# Patient Record
Sex: MALE | Race: BLACK OR AFRICAN AMERICAN | NOT HISPANIC OR LATINO
[De-identification: names, ages, dates, MRNs, and addresses within clinical notes are randomized per-mention and may not be internally consistent; named-entity substitution may affect disease eponyms.]

---

## 2020-04-26 ENCOUNTER — MESSAGE (OUTPATIENT)
Age: 30
End: 2020-04-26

## 2020-05-07 LAB
SARS-COV-2 IGG SERPL IA-ACNC: <0.1 INDEX
SARS-COV-2 IGG SERPL QL IA: NEGATIVE

## 2021-11-03 ENCOUNTER — EMERGENCY (EMERGENCY)
Facility: HOSPITAL | Age: 31
LOS: 1 days | Discharge: HOME | End: 2021-11-03
Attending: EMERGENCY MEDICINE
Payer: COMMERCIAL

## 2021-11-03 VITALS
TEMPERATURE: 98 F | HEIGHT: 72 IN | RESPIRATION RATE: 18 BRPM | SYSTOLIC BLOOD PRESSURE: 154 MMHG | OXYGEN SATURATION: 99 % | WEIGHT: 235.01 LBS | HEART RATE: 99 BPM | DIASTOLIC BLOOD PRESSURE: 99 MMHG

## 2021-11-03 VITALS — DIASTOLIC BLOOD PRESSURE: 101 MMHG | SYSTOLIC BLOOD PRESSURE: 148 MMHG

## 2021-11-03 PROCEDURE — 99282 EMERGENCY DEPT VISIT SF MDM: CPT

## 2021-11-03 PROCEDURE — 99284 EMERGENCY DEPT VISIT MOD MDM: CPT

## 2021-11-03 PROCEDURE — 82962 GLUCOSE BLOOD TEST: CPT

## 2021-11-03 NOTE — ED ADULT TRIAGE NOTE - CHIEF COMPLAINT QUOTE
Sudden onset of Dizziness on standing 1800, pt states "I though I was going to fall" no facial asymmetry, no arm drift

## 2021-11-03 NOTE — ED PROVIDER NOTE - OBJECTIVE STATEMENT
Dr. Mueller Note: 31M no PMHx, preceding "fuzziness" with decreased hearing sensation of b/l ears for few days with acute dizziness when standing up in cafeteria, improved now, worse with position.  No diplopia, dysphagia, dysarthria, ataxia, focal weakness in arm or leg. No Fhx of stroke/aneursym.

## 2021-11-03 NOTE — ED PROVIDER NOTE - PROGRESS NOTE DETAILS
Patient dizziness. Improved. Patient states he has had some congestion for the past few days. States his ears feel full. Neuro exam completely intact. Ears without signs of infection or otorrhea. Patient has ENT follow-up scheduled with ENT in 2 days. Encouraged patient to follow-up with ENT. Cristina Rodriguez MD

## 2021-11-03 NOTE — ED ADULT NURSE NOTE - OBJECTIVE STATEMENT
31 year old male patient presents ambulatory to ED c/o episode of dizziness after eating lasting for 15 minutes. Patient states he stood up after eating and felt sudden onset of dizziness and states he felt like he was going to pass out. Code stroke called upon triage at 1828, and cancelled by MD Mueller at 1830. Patient reports having pressure to b/l ears and reports decreased hearing today. Denies current HA, dizziness, CP, palpitations, SOB, abd pain, n/v/d, fever, chills, numbness, tingling, weakness. Patient aware of plan of care for evaluation. Patient has appointment with ENT this week.

## 2021-11-03 NOTE — ED PROVIDER NOTE - NSFOLLOWUPCLINICS_GEN_ALL_ED_FT
[facility] - ENT  Otolaryngology (ENT)  [address] [place] [address]  Phone: [phone]  Fax:

## 2021-11-03 NOTE — ED PROVIDER NOTE - PATIENT PORTAL LINK FT
You can access the FollowMyHealth Patient Portal offered by [facility] by registering at the following website: [email or web address]. By joining [facility]’s FollowMyHealth portal, you will also be able to view your health information using other applications (apps) compatible with our system.

## 2021-11-03 NOTE — ED PROVIDER NOTE - CLINICAL SUMMARY MEDICAL DECISION MAKING FREE TEXT BOX
Dr. Mueller Note: acute lightheadedness, possibly vertigo spell without focal neuro deficit and resolving symptoms, full exam, accucheck, serial exam

## 2021-11-03 NOTE — ED PROVIDER NOTE - NSFOLLOWUPINSTRUCTIONS_ED_ALL_ED_FT
You were evaluated in the Emergency Department for dizziness.  You were evaluated and examined by a physician, and your blood sugar was normal and your physical exam did not show a cause for your symptoms.     There are no signs of emergency conditions requiring admission to the hospital on today's workup.     We recommend that you:  1. See your ENT within the next 72 hours for follow up.  Bring a copy of your discharge paperwork (including any test results) to your doctor.  2. Please only take cold medication as needed.       Return immediately if you have worsening symptoms, passing out, chest pain, vomiting, confusion, sleepiness, or any other new/concerning symptoms. You were evaluated in the Emergency Department for dizziness.  You were evaluated and examined by a physician, and your blood sugar was normal and your physical exam did not show a cause for your symptoms.     There are no signs of emergency conditions requiring admission to the hospital on today's workup.     We recommend that you:  1. See your ENT within the next 72 hours for follow up.  Bring a copy of your discharge paperwork (including any test results) to your doctor.  2. Please only take cold medication as needed.   3. Please take Zyrtec-D that can be purchased over the counter as instructed on the box.       Return immediately if you have worsening symptoms, passing out, chest pain, vomiting, confusion, sleepiness, or any other new/concerning symptoms.

## 2021-11-03 NOTE — CHART NOTE - NSCHARTNOTEFT_GEN_A_CORE
Patient 32yo M employee presents for acute onset dizziness when standing and nonfocal neuro exam. Code stroke ended in agreement with ED attending and since this does not meet BEFAST criteria.     Please call neuro as needed.

## 2024-09-16 ENCOUNTER — INPATIENT (INPATIENT)
Facility: HOSPITAL | Age: 34
LOS: 3 days | Discharge: HOME | DRG: 440 | End: 2024-09-20
Attending: INTERNAL MEDICINE | Admitting: INTERNAL MEDICINE
Payer: COMMERCIAL

## 2024-09-16 VITALS
TEMPERATURE: 98 F | OXYGEN SATURATION: 98 % | RESPIRATION RATE: 18 BRPM | DIASTOLIC BLOOD PRESSURE: 91 MMHG | WEIGHT: 220.02 LBS | SYSTOLIC BLOOD PRESSURE: 130 MMHG

## 2024-09-16 LAB
ADD ON TEST-SPECIMEN IN LAB: SIGNIFICANT CHANGE UP
ALBUMIN SERPL ELPH-MCNC: 4 G/DL — SIGNIFICANT CHANGE UP (ref 3.3–5)
ALP SERPL-CCNC: 126 U/L — HIGH (ref 40–120)
ALT FLD-CCNC: 70 U/L — HIGH (ref 10–45)
ANION GAP SERPL CALC-SCNC: 17 MMOL/L — SIGNIFICANT CHANGE UP (ref 5–17)
APTT BLD: 35.2 SEC — SIGNIFICANT CHANGE UP (ref 24.5–35.6)
AST SERPL-CCNC: 84 U/L — HIGH (ref 10–40)
BASOPHILS # BLD AUTO: 0 K/UL — SIGNIFICANT CHANGE UP (ref 0–0.2)
BASOPHILS NFR BLD AUTO: 0 % — SIGNIFICANT CHANGE UP (ref 0–2)
BILIRUB SERPL-MCNC: 1.8 MG/DL — HIGH (ref 0.2–1.2)
BLD GP AB SCN SERPL QL: NEGATIVE — SIGNIFICANT CHANGE UP
BUN SERPL-MCNC: 8 MG/DL — SIGNIFICANT CHANGE UP (ref 7–23)
CALCIUM SERPL-MCNC: 9.2 MG/DL — SIGNIFICANT CHANGE UP (ref 8.4–10.5)
CHLORIDE SERPL-SCNC: 99 MMOL/L — SIGNIFICANT CHANGE UP (ref 96–108)
CHOLEST SERPL-MCNC: 457 MG/DL — HIGH
CO2 SERPL-SCNC: 20 MMOL/L — LOW (ref 22–31)
CREAT SERPL-MCNC: 1.1 MG/DL — SIGNIFICANT CHANGE UP (ref 0.5–1.3)
DACRYOCYTES BLD QL SMEAR: SLIGHT — SIGNIFICANT CHANGE UP
EGFR: 91 ML/MIN/1.73M2 — SIGNIFICANT CHANGE UP
EOSINOPHIL # BLD AUTO: 0 K/UL — SIGNIFICANT CHANGE UP (ref 0–0.5)
EOSINOPHIL NFR BLD AUTO: 0 % — SIGNIFICANT CHANGE UP (ref 0–6)
FLUAV AG NPH QL: SIGNIFICANT CHANGE UP
FLUBV AG NPH QL: SIGNIFICANT CHANGE UP
GLUCOSE SERPL-MCNC: 128 MG/DL — HIGH (ref 70–99)
HCT VFR BLD CALC: 41.9 % — SIGNIFICANT CHANGE UP (ref 39–50)
HDLC SERPL-MCNC: 21 MG/DL — LOW
HGB BLD-MCNC: 15.1 G/DL — SIGNIFICANT CHANGE UP (ref 13–17)
INR BLD: 1.12 RATIO — SIGNIFICANT CHANGE UP (ref 0.85–1.18)
LIDOCAIN IGE QN: 500 U/L — HIGH (ref 7–60)
LIPID PNL WITH DIRECT LDL SERPL: SIGNIFICANT CHANGE UP MG/DL
LYMPHOCYTES # BLD AUTO: 0.89 K/UL — LOW (ref 1–3.3)
LYMPHOCYTES # BLD AUTO: 5.2 % — LOW (ref 13–44)
MANUAL SMEAR VERIFICATION: SIGNIFICANT CHANGE UP
MCHC RBC-ENTMCNC: 32.5 PG — SIGNIFICANT CHANGE UP (ref 27–34)
MCHC RBC-ENTMCNC: 36 GM/DL — SIGNIFICANT CHANGE UP (ref 32–36)
MCV RBC AUTO: 90.1 FL — SIGNIFICANT CHANGE UP (ref 80–100)
MONOCYTES # BLD AUTO: 1.33 K/UL — HIGH (ref 0–0.9)
MONOCYTES NFR BLD AUTO: 7.7 % — SIGNIFICANT CHANGE UP (ref 2–14)
NEUTROPHILS # BLD AUTO: 14.99 K/UL — HIGH (ref 1.8–7.4)
NEUTROPHILS NFR BLD AUTO: 87.1 % — HIGH (ref 43–77)
NON HDL CHOLESTEROL: 436 MG/DL — HIGH
PLAT MORPH BLD: NORMAL — SIGNIFICANT CHANGE UP
PLATELET # BLD AUTO: 183 K/UL — SIGNIFICANT CHANGE UP (ref 150–400)
POIKILOCYTOSIS BLD QL AUTO: SLIGHT — SIGNIFICANT CHANGE UP
POTASSIUM SERPL-MCNC: 3.3 MMOL/L — LOW (ref 3.5–5.3)
POTASSIUM SERPL-SCNC: 3.3 MMOL/L — LOW (ref 3.5–5.3)
PROT SERPL-MCNC: 7.4 G/DL — SIGNIFICANT CHANGE UP (ref 6–8.3)
PROTHROM AB SERPL-ACNC: 11.7 SEC — SIGNIFICANT CHANGE UP (ref 9.5–13)
RBC # BLD: 4.65 M/UL — SIGNIFICANT CHANGE UP (ref 4.2–5.8)
RBC # FLD: 12.8 % — SIGNIFICANT CHANGE UP (ref 10.3–14.5)
RBC BLD AUTO: ABNORMAL
RH IG SCN BLD-IMP: POSITIVE — SIGNIFICANT CHANGE UP
RSV RNA NPH QL NAA+NON-PROBE: SIGNIFICANT CHANGE UP
SARS-COV-2 RNA SPEC QL NAA+PROBE: SIGNIFICANT CHANGE UP
SODIUM SERPL-SCNC: 136 MMOL/L — SIGNIFICANT CHANGE UP (ref 135–145)
TRIGL SERPL-MCNC: 1457 MG/DL — HIGH
WBC # BLD: 17.21 K/UL — HIGH (ref 3.8–10.5)
WBC # FLD AUTO: 17.21 K/UL — HIGH (ref 3.8–10.5)

## 2024-09-16 PROCEDURE — 99053 MED SERV 10PM-8AM 24 HR FAC: CPT

## 2024-09-16 PROCEDURE — 74177 CT ABD & PELVIS W/CONTRAST: CPT | Mod: 26,MC

## 2024-09-16 PROCEDURE — 99285 EMERGENCY DEPT VISIT HI MDM: CPT

## 2024-09-16 RX ORDER — OXYCODONE AND ACETAMINOPHEN 7.5; 325 MG/1; MG/1
1 TABLET ORAL ONCE
Refills: 0 | Status: DISCONTINUED | OUTPATIENT
Start: 2024-09-16 | End: 2024-09-16

## 2024-09-16 RX ORDER — SODIUM CHLORIDE 9 MG/ML
1000 INJECTION INTRAMUSCULAR; INTRAVENOUS; SUBCUTANEOUS
Refills: 0 | Status: DISCONTINUED | OUTPATIENT
Start: 2024-09-16 | End: 2024-09-20

## 2024-09-16 RX ORDER — ACETAMINOPHEN 325 MG/1
1000 TABLET ORAL ONCE
Refills: 0 | Status: COMPLETED | OUTPATIENT
Start: 2024-09-16 | End: 2024-09-16

## 2024-09-16 RX ORDER — SODIUM CHLORIDE 9 MG/ML
1000 INJECTION INTRAMUSCULAR; INTRAVENOUS; SUBCUTANEOUS ONCE
Refills: 0 | Status: COMPLETED | OUTPATIENT
Start: 2024-09-16 | End: 2024-09-16

## 2024-09-16 RX ORDER — FLU VACCINE TS 2012-2013(5YR+) 45MCG/.5ML
0.5 VIAL (ML) INTRAMUSCULAR ONCE
Refills: 0 | Status: DISCONTINUED | OUTPATIENT
Start: 2024-09-16 | End: 2024-09-20

## 2024-09-16 RX ORDER — OXYCODONE HYDROCHLORIDE 5 MG/1
5 TABLET ORAL EVERY 6 HOURS
Refills: 0 | Status: DISCONTINUED | OUTPATIENT
Start: 2024-09-16 | End: 2024-09-17

## 2024-09-16 RX ORDER — POTASSIUM CHLORIDE 10 MEQ
40 TABLET, EXT RELEASE, PARTICLES/CRYSTALS ORAL ONCE
Refills: 0 | Status: COMPLETED | OUTPATIENT
Start: 2024-09-16 | End: 2024-09-16

## 2024-09-16 RX ADMIN — OXYCODONE HYDROCHLORIDE 5 MILLIGRAM(S): 5 TABLET ORAL at 22:31

## 2024-09-16 RX ADMIN — SODIUM CHLORIDE 1000 MILLILITER(S): 9 INJECTION INTRAMUSCULAR; INTRAVENOUS; SUBCUTANEOUS at 09:37

## 2024-09-16 RX ADMIN — OXYCODONE HYDROCHLORIDE 5 MILLIGRAM(S): 5 TABLET ORAL at 21:41

## 2024-09-16 RX ADMIN — Medication 1 MILLIGRAM(S): at 23:01

## 2024-09-16 RX ADMIN — Medication 1 MILLIGRAM(S): at 15:54

## 2024-09-16 RX ADMIN — ACETAMINOPHEN 400 MILLIGRAM(S): 325 TABLET ORAL at 07:59

## 2024-09-16 RX ADMIN — OXYCODONE HYDROCHLORIDE 5 MILLIGRAM(S): 5 TABLET ORAL at 12:05

## 2024-09-16 RX ADMIN — Medication 40 MILLIEQUIVALENT(S): at 18:19

## 2024-09-16 RX ADMIN — SODIUM CHLORIDE 1000 MILLILITER(S): 9 INJECTION INTRAMUSCULAR; INTRAVENOUS; SUBCUTANEOUS at 07:59

## 2024-09-16 NOTE — CONSULT NOTE ADULT - SUBJECTIVE AND OBJECTIVE BOX
[facility]    Branden Philippe Núñez NP    [address] [place] [address] [phone]      Chief Complaint:  Patient is a 34y old  Male who presents with a chief complaint of     HPI:33 yo M with no reported PMH p/w RLQ pain x Sunday morning. Pain has been constant since onset, was radiating to his back but now localized to his abdomen. Worse with certain movements. Has never had before. Denies nausea, vomiting, fever, chills, chest pain, diarrhea, blood in stool, diarrhea, penile pain/swelling, testicular pain/swelling. No recent travel.  admits to recent alcohol use     Allergies:  No Known Allergies      Medications:  morphine  - Injectable 1 milliGRAM(s) IV Push every 6 hours PRN  oxyCODONE    IR 5 milliGRAM(s) Oral every 6 hours PRN  sodium chloride 0.9%. 1000 milliLiter(s) IV Continuous <Continuous>      PMHX/PSHX:  No pertinent past medical history    No significant past surgical history        Family history:  No pertinent family history in first degree relatives        Social History:  + etoh use    ROS:     General:  No wt loss, fevers, chills, night sweats, fatigue,   Eyes:  Good vision, no reported pain  ENT:  No sore throat, pain, runny nose, dysphagia  CV:  No pain, palpitations, hypo/hypertension  Resp:  No dyspnea, cough, tachypnea, wheezing  GI:  + pain, No nausea, No vomiting, No diarrhea, No constipation, No weight loss, No fever, No pruritis, No rectal bleeding, No tarry stools, No dysphagia,  :  No pain, bleeding, incontinence, nocturia  Muscle:  No pain, weakness  Neuro:  No weakness, tingling, memory problems  Psych:  No fatigue, insomnia, mood problems, depression  Endocrine:  No polyuria, polydipsia, cold/heat intolerance  Heme:  No petechiae, ecchymosis, easy bruisability  Skin:  No rash, tattoos, scars, edema      PHYSICAL EXAM:   Vital Signs:  Vital Signs Last 24 Hrs  T(C): 36.7 (16 Sep 2024 13:04), Max: 36.7 (16 Sep 2024 07:21)  T(F): 98.1 (16 Sep 2024 13:04), Max: 98.1 (16 Sep 2024 07:21)  HR: 67 (16 Sep 2024 13:04) (67 - 94)  BP: 132/87 (16 Sep 2024 13:04) (130/91 - 135/85)  BP(mean): --  RR: 18 (16 Sep 2024 13:04) (17 - 18)  SpO2: 99% (16 Sep 2024 13:04) (98% - 99%)    Parameters below as of 16 Sep 2024 13:04  Patient On (Oxygen Delivery Method): room air      Daily     Daily     GENERAL:  Appears stated age, well-groomed, well-nourished, no distress  HEENT:  NC/AT,  conjunctivae clear and pink, no thyromegaly, nodules, adenopathy, no JVD, sclera -anicteric  CHEST:  Full & symmetric excursion, no increased effort, breath sounds clear  HEART:  Regular rhythm, S1, S2, no murmur/rub/S3/S4, no abdominal bruit, no edema  ABDOMEN:  Soft, non-tender, non-distended, normoactive bowel sounds,  no masses ,no hepato-splenomegaly, no signs of chronic liver disease  EXTEREMITIES:  no cyanosis,clubbing or edema  SKIN:  No rash/erythema/ecchymoses/petechiae/wounds/abscess/warm/dry  NEURO:  Alert, oriented, no asterixis, no tremor, no encephalopathy    LABS:                        15.1   17.21 )-----------( 183      ( 16 Sep 2024 08:51 )             41.9     09-16    136  |  99  |  8   ----------------------------<  128<H>  3.3<L>   |  20<L>  |  1.10    Ca    9.2      16 Sep 2024 08:51    TPro  7.4  /  Alb  4.0  /  TBili  1.8<H>  /  DBili  x   /  AST  84<H>  /  ALT  70<H>  /  AlkPhos  126<H>  09-16    LIVER FUNCTIONS - ( 16 Sep 2024 08:51 )  Alb: 4.0 g/dL / Pro: 7.4 g/dL / ALK PHOS: 126 U/L / ALT: 70 U/L / AST: 84 U/L / GGT: x           PT/INR - ( 16 Sep 2024 08:51 )   PT: 11.7 sec;   INR: 1.12 ratio         PTT - ( 16 Sep 2024 08:51 )  PTT:35.2 sec  Urinalysis Basic - ( 16 Sep 2024 08:51 )    Color: x / Appearance: x / SG: x / pH: x  Gluc: 128 mg/dL / Ketone: x  / Bili: x / Urobili: x   Blood: x / Protein: x / Nitrite: x   Leuk Esterase: x / RBC: x / WBC x   Sq Epi: x / Non Sq Epi: x / Bacteria: x      Amylase Serum--      Lipase [id number]       Ammonia--      Imaging:

## 2024-09-16 NOTE — CONSULT NOTE ADULT - ASSESSMENT
pancreatitis  abnormal ct  elevated lfts    suspect etoh induced pancreatitis  npo  iv fluid  pain control  montior abdominal exam  etoh cessation  d/w patient

## 2024-09-16 NOTE — ED PROVIDER NOTE - ATTENDING APP SHARED VISIT CONTRIBUTION OF CARE
PMD none  34-year-old male no past medical history, presents with 1 day history of abdominal pain right lower quadrant, moderately severe worse with palpation, movement associated with slight chills back pain, without N/V, cough, chest pain, urinary symptoms.  Patient denies habits, travel, DM, HTN, HLD, CVA, CAD, abdominal surgery, previous similar symptoms.  Social patient is single, employed [employer]  HEENT normocephalic/atraumatic.  Chest clear A&P.  CV no rubs gallop murmur.  Patient tachycardic.  Abdomen right lower quadrant tenderness, without rebound guarding or masses.  No Cazares's, scars, rashes.  Neuro GCS 15 speech fluent moves all extremities.  Yakov Jaramillo MD, Facep

## 2024-09-16 NOTE — ED PROVIDER NOTE - OBJECTIVE STATEMENT
35 yo M with no reported PMH p/w RLQ pain x Sunday morning. Pain has been constant since onset, was radiating to his back but now localized to his abdomen. Worse with certain movements. Has never had before. Denies nausea, vomiting, fever, chills, chest pain, diarrhea, blood in stool, diarrhea, penile pain/swelling, testicular pain/swelling. No recent travel.

## 2024-09-16 NOTE — H&P ADULT - NSHPPHYSICALEXAM_GEN_ALL_CORE
Vital Signs Last 24 Hrs  T(C): 36.7 (16 Sep 2024 11:12), Max: 36.7 (16 Sep 2024 07:21)  T(F): 98.1 (16 Sep 2024 11:12), Max: 98.1 (16 Sep 2024 07:21)  HR: 94 (16 Sep 2024 11:12) (94 - 94)  BP: 135/85 (16 Sep 2024 11:12) (130/91 - 135/85)  BP(mean): --  RR: 17 (16 Sep 2024 11:12) (17 - 18)  SpO2: 98% (16 Sep 2024 11:12) (98% - 98%)    Parameters below as of 16 Sep 2024 11:12  Patient On (Oxygen Delivery Method): room air      PHYSICAL EXAM:  GENERAL: NAD, well-developed  HEAD:  Atraumatic, Normocephalic  EYES: EOMI, PERRLA, conjunctiva and sclera clear  NECK: Supple, No JVD  CHEST/LUNG: Clear to auscultation bilaterally; No wheeze  HEART: Regular rate and rhythm; No murmurs, rubs, or gallops  ABDOMEN: Soft, Nontender, Nondistended; Bowel sounds present  EXTREMITIES:  2+ Peripheral Pulses, No clubbing, cyanosis, or edema  PSYCH: AAOx3  NEUROLOGY: non-focal  SKIN: No rashes or lesions Vital Signs Last 24 Hrs  T(C): 36.7 (16 Sep 2024 11:12), Max: 36.7 (16 Sep 2024 07:21)  T(F): 98.1 (16 Sep 2024 11:12), Max: 98.1 (16 Sep 2024 07:21)  HR: 94 (16 Sep 2024 11:12) (94 - 94)  BP: 135/85 (16 Sep 2024 11:12) (130/91 - 135/85)  BP(mean): --  RR: 17 (16 Sep 2024 11:12) (17 - 18)  SpO2: 98% (16 Sep 2024 11:12) (98% - 98%)    Parameters below as of 16 Sep 2024 11:12  Patient On (Oxygen Delivery Method): room air      PHYSICAL EXAM:  GENERAL: NAD, well-developed  HEAD:  Atraumatic, Normocephalic  EYES: EOMI, PERRLA, conjunctiva and sclera clear  NECK: Supple, No JVD  CHEST/LUNG: Clear to auscultation bilaterally; No wheeze  HEART: Regular rate and rhythm; No murmurs, rubs, or gallops  ABDOMEN: Soft, mild epigastric tender, Nondistended; Bowel sounds present  EXTREMITIES:  2+ Peripheral Pulses, No clubbing, cyanosis, or edema  PSYCH: AAOx3  NEUROLOGY: non-focal  SKIN: No rashes or lesions

## 2024-09-16 NOTE — H&P ADULT - HISTORY OF PRESENT ILLNESS
33 yo M with no reported PMH p/w RLQ pain x Sunday morning. Pain has been constant since onset, was radiating to his back but now localized to his abdomen. Worse with certain movements. Has never had before. Denies nausea, vomiting, fever, chills, chest pain, diarrhea, blood in stool, diarrhea, penile pain/swelling, testicular pain/swelling. No recent travel. 33 yo M with no reported PMH p/w RLQ pain x Sunday morning. Pain has been constant since onset, was radiating to his back but now localized to his abdomen. Worse with certain movements. Has never had before. Denies nausea, vomiting, fever, chills, chest pain, diarrhea, blood in stool, diarrhea, penile pain/swelling, testicular pain/swelling. No recent travel.  admits to recent alcohol use

## 2024-09-16 NOTE — ED ADULT NURSE NOTE - OBJECTIVE STATEMENT
Patient is a 34y male presenting to the ED with c/o abdominal pain. Patient A&Ox4. Patient is speaking coherently in full sentences. Reports developing right sided lower abdominal pain yesterday morning; states the pain worsened last night and this morning. States he was unable to sleep last night due to the pain. Also reports nausea and "vomited a little" this morning. Denies diarrhea, chest pain, SOB, fever/chills, burning upon urination or difficulty urinating. Respirations spontaneous, even, and non-labored. Abdomen soft, non-distended and RLQ is tender to palpation.

## 2024-09-16 NOTE — ED PROVIDER NOTE - CLINICAL SUMMARY MEDICAL DECISION MAKING FREE TEXT BOX
Otherwise healthy adult male with 1 day history of right lower quadrant abdominal pain and tenderness, concerns for appendicitis, diverticulitis less likely biliary disease.  Plan IV fluids, analgesics, CT abdomen pelvis, reassess.  Yakov Jaramillo MD, Facep

## 2024-09-16 NOTE — H&P ADULT - ASSESSMENT
33 yo male no known pmhx here with acute pancreatitis         35 yo male no known pmhx here with acute pancreatitis, likley 2/2 alcohol use    pancreatitis  gi consulted  npo  ivf  pain control  trend enzymes  check FLP    dvt ppx      Advanced care planning was discussed with patient and family.  Advanced care planning forms were reviewed and discussed as appropriate.  Differential diagnosis and plan of care discussed with patient after the evaluation.   Pain assessed and judicious use of narcotics when appropriate was discussed.  Importance of Fall prevention discussed.  Counseling on Smoking and Alcohol cessation was offered when appropriate.  Counseling on Diet, exercise, and medication compliance was done.

## 2024-09-16 NOTE — ED PROVIDER NOTE - PHYSICAL EXAMINATION
CONSTITUTIONAL: Well appearing and in no apparent distress.  ENT: Airway patent, moist mucous membranes.   EYES: Pupils equal, round and reactive to light. EOMI. Conjunctiva normal appearing.   CARDIAC: Normal rate, regular rhythm.  Heart sounds S1, S2.    RESPIRATORY: Breath sounds clear and equal bilaterally.   GASTROINTESTINAL: Abdomen soft, +RLQ TTP, not distended. No rebound or guarding.   MUSCULOSKELETAL: Spine appears normal.  NEUROLOGICAL: Alert and oriented x3, no focal deficits, no motor or sensory deficits. 5/5 muscle strength throughout.  SKIN: Skin normal color, warm, dry and intact.   PSYCHIATRIC: Normal mood and affect.

## 2024-09-16 NOTE — ED ADULT NURSE NOTE - NSFALLUNIVINTERV_ED_ALL_ED
Bed/Stretcher in lowest position, wheels locked, appropriate side rails in place/Call bell, personal items and telephone in reach/Instruct patient to call for assistance before getting out of bed/chair/stretcher/Non-slip footwear applied when patient is off stretcher/Veteran to call system/Physically safe environment - no spills, clutter or unnecessary equipment/Purposeful proactive rounding/Room/bathroom lighting operational, light cord in reach

## 2024-09-17 LAB
ADD ON TEST-SPECIMEN IN LAB: SIGNIFICANT CHANGE UP
ALBUMIN SERPL ELPH-MCNC: 3.1 G/DL — LOW (ref 3.3–5)
ALP SERPL-CCNC: 92 U/L — SIGNIFICANT CHANGE UP (ref 40–120)
ALT FLD-CCNC: 42 U/L — SIGNIFICANT CHANGE UP (ref 10–45)
ANION GAP SERPL CALC-SCNC: 13 MMOL/L — SIGNIFICANT CHANGE UP (ref 5–17)
AST SERPL-CCNC: 59 U/L — HIGH (ref 10–40)
BILIRUB SERPL-MCNC: 1.3 MG/DL — HIGH (ref 0.2–1.2)
BUN SERPL-MCNC: 5 MG/DL — LOW (ref 7–23)
CALCIUM SERPL-MCNC: 8.5 MG/DL — SIGNIFICANT CHANGE UP (ref 8.4–10.5)
CHLORIDE SERPL-SCNC: 103 MMOL/L — SIGNIFICANT CHANGE UP (ref 96–108)
CO2 SERPL-SCNC: 18 MMOL/L — LOW (ref 22–31)
CREAT SERPL-MCNC: 0.71 MG/DL — SIGNIFICANT CHANGE UP (ref 0.5–1.3)
EGFR: 124 ML/MIN/1.73M2 — SIGNIFICANT CHANGE UP
GLUCOSE SERPL-MCNC: 92 MG/DL — SIGNIFICANT CHANGE UP (ref 70–99)
HCT VFR BLD CALC: 37.4 % — LOW (ref 39–50)
HGB BLD-MCNC: 12.6 G/DL — LOW (ref 13–17)
LIDOCAIN IGE QN: 185 U/L — HIGH (ref 7–60)
MCHC RBC-ENTMCNC: 31.3 PG — SIGNIFICANT CHANGE UP (ref 27–34)
MCHC RBC-ENTMCNC: 33.7 GM/DL — SIGNIFICANT CHANGE UP (ref 32–36)
MCV RBC AUTO: 93 FL — SIGNIFICANT CHANGE UP (ref 80–100)
NRBC # BLD: 0 /100 WBCS — SIGNIFICANT CHANGE UP (ref 0–0)
PLATELET # BLD AUTO: 136 K/UL — LOW (ref 150–400)
POTASSIUM SERPL-MCNC: 3.9 MMOL/L — SIGNIFICANT CHANGE UP (ref 3.5–5.3)
POTASSIUM SERPL-SCNC: 3.9 MMOL/L — SIGNIFICANT CHANGE UP (ref 3.5–5.3)
PROT SERPL-MCNC: 6.5 G/DL — SIGNIFICANT CHANGE UP (ref 6–8.3)
RBC # BLD: 4.02 M/UL — LOW (ref 4.2–5.8)
RBC # FLD: 13.3 % — SIGNIFICANT CHANGE UP (ref 10.3–14.5)
SODIUM SERPL-SCNC: 134 MMOL/L — LOW (ref 135–145)
WBC # BLD: 19.58 K/UL — HIGH (ref 3.8–10.5)
WBC # FLD AUTO: 19.58 K/UL — HIGH (ref 3.8–10.5)

## 2024-09-17 RX ORDER — FENOFIBRATE 145 MG/1
48 TABLET, FILM COATED ORAL DAILY
Refills: 0 | Status: DISCONTINUED | OUTPATIENT
Start: 2024-09-17 | End: 2024-09-20

## 2024-09-17 RX ADMIN — Medication 1 MILLIGRAM(S): at 12:16

## 2024-09-17 RX ADMIN — Medication 2 MILLIGRAM(S): at 21:30

## 2024-09-17 RX ADMIN — OXYCODONE HYDROCHLORIDE 5 MILLIGRAM(S): 5 TABLET ORAL at 08:33

## 2024-09-17 RX ADMIN — Medication 2 MILLIGRAM(S): at 20:55

## 2024-09-17 RX ADMIN — Medication 1 MILLIGRAM(S): at 05:04

## 2024-09-17 RX ADMIN — FENOFIBRATE 48 MILLIGRAM(S): 145 TABLET, FILM COATED ORAL at 12:16

## 2024-09-17 RX ADMIN — OXYCODONE HYDROCHLORIDE 5 MILLIGRAM(S): 5 TABLET ORAL at 07:33

## 2024-09-17 RX ADMIN — Medication 1 MILLIGRAM(S): at 05:51

## 2024-09-17 RX ADMIN — Medication 1 MILLIGRAM(S): at 00:01

## 2024-09-17 RX ADMIN — Medication 1 MILLIGRAM(S): at 13:16

## 2024-09-17 NOTE — PROGRESS NOTE ADULT - ASSESSMENT
33 yo male no known pmhx here with acute pancreatitis, likley 2/2 alcohol use    pancreatitis  gi consulted  npo  ivf  pain control  trend enzymes    hypertriglyceridemia  FLP noted  fenofibrate ordered  lifestyle modifications    alcohol cessation.     dvt ppx      Advanced care planning was discussed with patient and family.  Advanced care planning forms were reviewed and discussed as appropriate.  Differential diagnosis and plan of care discussed with patient after the evaluation.   Pain assessed and judicious use of narcotics when appropriate was discussed.  Importance of Fall prevention discussed.  Counseling on Smoking and Alcohol cessation was offered when appropriate.  Counseling on Diet, exercise, and medication compliance was done.                  33 yo male no known pmhx here with acute pancreatitis, likley 2/2 alcohol use    pancreatitis  gi consulted  npo  ivf  pain control  trend enzymes  alcohol cessation.     hypertriglyceridemia  FLP noted  fenofibrate ordered  lifestyle modifications    leukocytosis  likely reactive  monitor     dvt ppx      Advanced care planning was discussed with patient and family.  Advanced care planning forms were reviewed and discussed as appropriate.  Differential diagnosis and plan of care discussed with patient after the evaluation.   Pain assessed and judicious use of narcotics when appropriate was discussed.  Importance of Fall prevention discussed.  Counseling on Smoking and Alcohol cessation was offered when appropriate.  Counseling on Diet, exercise, and medication compliance was done.

## 2024-09-17 NOTE — PROGRESS NOTE ADULT - ASSESSMENT
pancreatitis  abnormal ct  elevated lfts    suspect etoh induced pancreatitis  clears as tolerated  iv fluid  pain control  montior abdominal exam  etoh cessation  d/w patient

## 2024-09-18 LAB
ALBUMIN SERPL ELPH-MCNC: 3 G/DL — LOW (ref 3.3–5)
ALP SERPL-CCNC: 98 U/L — SIGNIFICANT CHANGE UP (ref 40–120)
ALT FLD-CCNC: 35 U/L — SIGNIFICANT CHANGE UP (ref 10–45)
ANION GAP SERPL CALC-SCNC: 12 MMOL/L — SIGNIFICANT CHANGE UP (ref 5–17)
AST SERPL-CCNC: 45 U/L — HIGH (ref 10–40)
BILIRUB SERPL-MCNC: 1.6 MG/DL — HIGH (ref 0.2–1.2)
BUN SERPL-MCNC: 6 MG/DL — LOW (ref 7–23)
C DIFF GDH STL QL: NEGATIVE — SIGNIFICANT CHANGE UP
C DIFF GDH STL QL: SIGNIFICANT CHANGE UP
CALCIUM SERPL-MCNC: 8.4 MG/DL — SIGNIFICANT CHANGE UP (ref 8.4–10.5)
CHLORIDE SERPL-SCNC: 102 MMOL/L — SIGNIFICANT CHANGE UP (ref 96–108)
CHOLEST SERPL-MCNC: 249 MG/DL — HIGH
CO2 SERPL-SCNC: 18 MMOL/L — LOW (ref 22–31)
CREAT SERPL-MCNC: 0.68 MG/DL — SIGNIFICANT CHANGE UP (ref 0.5–1.3)
EGFR: 126 ML/MIN/1.73M2 — SIGNIFICANT CHANGE UP
GI PCR PANEL: SIGNIFICANT CHANGE UP
GLUCOSE SERPL-MCNC: 88 MG/DL — SIGNIFICANT CHANGE UP (ref 70–99)
HCT VFR BLD CALC: 34 % — LOW (ref 39–50)
HDLC SERPL-MCNC: 28 MG/DL — LOW
HGB BLD-MCNC: 11.6 G/DL — LOW (ref 13–17)
LIPID PNL WITH DIRECT LDL SERPL: 170 MG/DL — HIGH
MCHC RBC-ENTMCNC: 32.7 PG — SIGNIFICANT CHANGE UP (ref 27–34)
MCHC RBC-ENTMCNC: 34.1 GM/DL — SIGNIFICANT CHANGE UP (ref 32–36)
MCV RBC AUTO: 95.8 FL — SIGNIFICANT CHANGE UP (ref 80–100)
NON HDL CHOLESTEROL: 221 MG/DL — HIGH
NRBC # BLD: 0 /100 WBCS — SIGNIFICANT CHANGE UP (ref 0–0)
PLATELET # BLD AUTO: 135 K/UL — LOW (ref 150–400)
POTASSIUM SERPL-MCNC: 3.9 MMOL/L — SIGNIFICANT CHANGE UP (ref 3.5–5.3)
POTASSIUM SERPL-SCNC: 3.9 MMOL/L — SIGNIFICANT CHANGE UP (ref 3.5–5.3)
PROT SERPL-MCNC: 6.3 G/DL — SIGNIFICANT CHANGE UP (ref 6–8.3)
RBC # BLD: 3.55 M/UL — LOW (ref 4.2–5.8)
RBC # FLD: 13.4 % — SIGNIFICANT CHANGE UP (ref 10.3–14.5)
SODIUM SERPL-SCNC: 132 MMOL/L — LOW (ref 135–145)
TRIGL SERPL-MCNC: 264 MG/DL — HIGH
WBC # BLD: 17.65 K/UL — HIGH (ref 3.8–10.5)
WBC # FLD AUTO: 17.65 K/UL — HIGH (ref 3.8–10.5)

## 2024-09-18 RX ADMIN — Medication 2 MILLIGRAM(S): at 12:27

## 2024-09-18 RX ADMIN — SODIUM CHLORIDE 125 MILLILITER(S): 9 INJECTION INTRAMUSCULAR; INTRAVENOUS; SUBCUTANEOUS at 17:51

## 2024-09-18 RX ADMIN — Medication 2 MILLIGRAM(S): at 22:12

## 2024-09-18 RX ADMIN — Medication 2 MILLIGRAM(S): at 05:31

## 2024-09-18 RX ADMIN — SODIUM CHLORIDE 125 MILLILITER(S): 9 INJECTION INTRAMUSCULAR; INTRAVENOUS; SUBCUTANEOUS at 09:37

## 2024-09-18 RX ADMIN — Medication 2 MILLIGRAM(S): at 06:07

## 2024-09-18 RX ADMIN — Medication 2 MILLIGRAM(S): at 22:52

## 2024-09-18 RX ADMIN — Medication 2 MILLIGRAM(S): at 11:57

## 2024-09-18 RX ADMIN — FENOFIBRATE 48 MILLIGRAM(S): 145 TABLET, FILM COATED ORAL at 11:56

## 2024-09-18 NOTE — PROGRESS NOTE ADULT - ASSESSMENT
pancreatitis  abnormal ct  elevated lfts    suspect etoh induced pancreatitis  low fat diet  TG noted  on fenofibrate now  pain control  montior abdominal exam  etoh cessation  d/w patient    I reviewed the overnight course of events on the unit, re-confirming the patient history. I discussed the care with the patient and their family  The plan of care was discussed with the physician assistant and modifications were made to the notation where appropriate.   Differential diagnosis and plan of care discussed with patient after the evaluation  35 minutes spent on total encounter of which more than fifty percent of the encounter was spent counseling and/or coordinating care by the attending physician.  Advanced care planning was discussed with patient and family.  Advanced care planning forms were reviewed and discussed.  Risks, benefits and alternatives of gastroenterologic procedures were discussed in detail and all questions were answered.

## 2024-09-18 NOTE — PROGRESS NOTE ADULT - ASSESSMENT
33 yo male no known pmhx here with acute pancreatitis, likley 2/2 alcohol use    pancreatitis  gi consulted  clears diet   ivf  pain control  trend enzymes  alcohol cessation.     hypertriglyceridemia  FLP noted  fenofibrate ordered  lifestyle modifications    leukocytosis  likely reactive  monitor     diarrhea  check stool pcr and c diff       dvt ppx      Advanced care planning was discussed with patient and family.  Advanced care planning forms were reviewed and discussed as appropriate.  Differential diagnosis and plan of care discussed with patient after the evaluation.   Pain assessed and judicious use of narcotics when appropriate was discussed.  Importance of Fall prevention discussed.  Counseling on Smoking and Alcohol cessation was offered when appropriate.  Counseling on Diet, exercise, and medication compliance was done.

## 2024-09-19 ENCOUNTER — TRANSCRIPTION ENCOUNTER (OUTPATIENT)
Age: 34
End: 2024-09-19

## 2024-09-19 LAB
ANION GAP SERPL CALC-SCNC: 14 MMOL/L — SIGNIFICANT CHANGE UP (ref 5–17)
BUN SERPL-MCNC: 7 MG/DL — SIGNIFICANT CHANGE UP (ref 7–23)
CALCIUM SERPL-MCNC: 8.5 MG/DL — SIGNIFICANT CHANGE UP (ref 8.4–10.5)
CHLORIDE SERPL-SCNC: 100 MMOL/L — SIGNIFICANT CHANGE UP (ref 96–108)
CO2 SERPL-SCNC: 20 MMOL/L — LOW (ref 22–31)
CREAT SERPL-MCNC: 0.66 MG/DL — SIGNIFICANT CHANGE UP (ref 0.5–1.3)
EGFR: 127 ML/MIN/1.73M2 — SIGNIFICANT CHANGE UP
GLUCOSE SERPL-MCNC: 88 MG/DL — SIGNIFICANT CHANGE UP (ref 70–99)
HCT VFR BLD CALC: 32.3 % — LOW (ref 39–50)
HGB BLD-MCNC: 10.8 G/DL — LOW (ref 13–17)
MCHC RBC-ENTMCNC: 32 PG — SIGNIFICANT CHANGE UP (ref 27–34)
MCHC RBC-ENTMCNC: 33.4 GM/DL — SIGNIFICANT CHANGE UP (ref 32–36)
MCV RBC AUTO: 95.8 FL — SIGNIFICANT CHANGE UP (ref 80–100)
NRBC # BLD: 0 /100 WBCS — SIGNIFICANT CHANGE UP (ref 0–0)
PLATELET # BLD AUTO: 153 K/UL — SIGNIFICANT CHANGE UP (ref 150–400)
POTASSIUM SERPL-MCNC: 3.2 MMOL/L — LOW (ref 3.5–5.3)
POTASSIUM SERPL-SCNC: 3.2 MMOL/L — LOW (ref 3.5–5.3)
RBC # BLD: 3.37 M/UL — LOW (ref 4.2–5.8)
RBC # FLD: 13.2 % — SIGNIFICANT CHANGE UP (ref 10.3–14.5)
SODIUM SERPL-SCNC: 134 MMOL/L — LOW (ref 135–145)
WBC # BLD: 17.14 K/UL — HIGH (ref 3.8–10.5)
WBC # FLD AUTO: 17.14 K/UL — HIGH (ref 3.8–10.5)

## 2024-09-19 RX ORDER — CHLORHEXIDINE GLUCONATE 40 MG/ML
1 SOLUTION TOPICAL DAILY
Refills: 0 | Status: DISCONTINUED | OUTPATIENT
Start: 2024-09-19 | End: 2024-09-20

## 2024-09-19 RX ORDER — POTASSIUM CHLORIDE 10 MEQ
40 TABLET, EXT RELEASE, PARTICLES/CRYSTALS ORAL ONCE
Refills: 0 | Status: COMPLETED | OUTPATIENT
Start: 2024-09-19 | End: 2024-09-19

## 2024-09-19 RX ADMIN — Medication 40 MILLIEQUIVALENT(S): at 17:24

## 2024-09-19 RX ADMIN — Medication 2 MILLIGRAM(S): at 07:43

## 2024-09-19 RX ADMIN — Medication 2 MILLIGRAM(S): at 08:20

## 2024-09-19 RX ADMIN — FENOFIBRATE 48 MILLIGRAM(S): 145 TABLET, FILM COATED ORAL at 11:09

## 2024-09-19 RX ADMIN — Medication 2 MILLIGRAM(S): at 17:23

## 2024-09-19 RX ADMIN — SODIUM CHLORIDE 125 MILLILITER(S): 9 INJECTION INTRAMUSCULAR; INTRAVENOUS; SUBCUTANEOUS at 17:27

## 2024-09-19 RX ADMIN — Medication 2 MILLIGRAM(S): at 17:55

## 2024-09-19 NOTE — DISCHARGE NOTE PROVIDER - NSDCCPCAREPLAN_GEN_ALL_CORE_FT
Patient Name: Steve Mckenzie    : 1995  MRN: [id number]    Subjective:  Steve is a 22 y.o. male who presents today for     1. Sore throat, started yesterday, taking aleve, lozenges at night which helps some. Exposure to friend with strep    Past Medical History  History reviewed. No pertinent past medical history.    Past Surgical History  History reviewed. No pertinent surgical history.    Family History  Family History   Problem Relation Age of Onset    Hyperlipidemia Mother     Obesity Mother     Diabetes Mother     Cancer Father     Hyperlipidemia Father     Obesity Father     Diabetes Paternal Grandfather     Amblyopia Neg Hx     Blindness Neg Hx     Cataracts Neg Hx     Glaucoma Neg Hx     Retinal detachment Neg Hx     Strabismus Neg Hx        Social History  Social History     Social History    Marital status: Single     Spouse name: N/A    Number of children: N/A    Years of education: N/A     Occupational History    Not on file.     Social History Main Topics    Smoking status: Never Smoker    Smokeless tobacco: Never Used    Alcohol use No    Drug use: No    Sexual activity: No     Other Topics Concern    Not on file     Social History Narrative    Lives with parents and older brotherLudin.  In school.        Allergies  Review of patient's allergies indicates:   Allergen Reactions    Bactrim [sulfamethoxazole-trimethoprim] Diarrhea    Promethazine-codeine Hives    -reviewed and updated      Medications  Reviewed and updated.   Current Outpatient Prescriptions   Medication Sig Dispense Refill    fluticasone (FLONASE) 50 mcg/actuation nasal spray INHALE 1 PUFF IN EACH NOSTRIL TWICE DAILY FOR ALLERGY SYMPTOMS  6     Current Facility-Administered Medications   Medication Dose Route Frequency Provider Last Rate Last Dose    bupivacaine (PF) 0.5% (5 mg/ml) injection 50 mg  10 mL Subcutaneous 1 time in Clinic/HOD Amie Vega DPM             Review of Systems  "  Constitutional: Negative for chills and fever.   HENT: Positive for congestion and sore throat. Negative for ear pain.    Respiratory: Negative for cough.    Cardiovascular: Negative for chest pain.   Gastrointestinal: Negative for abdominal pain.   Musculoskeletal: Negative for myalgias.         Physical Exam  /66   Pulse 96   Temp 96.7 °F (35.9 °C) (Oral)   Ht 5' 11" (1.803 m)   Wt 97.7 kg (215 lb 6.2 oz)   SpO2 99%   BMI 30.04 kg/m²   Physical Exam   Constitutional: He appears well-developed. No distress.   HENT:   Head: Normocephalic.   Right Ear: A middle ear effusion is present.   Left Ear: A middle ear effusion is present.   Nose: Mucosal edema present.   Mouth/Throat: Oropharyngeal exudate, posterior oropharyngeal edema and posterior oropharyngeal erythema present.   Cardiovascular: Normal rate, regular rhythm and normal heart sounds.    Pulmonary/Chest: Effort normal and breath sounds normal.   Skin: He is not diaphoretic.         Assessment/Plan:  Steve Mckenzie is a 22 y.o. male who presents today for :    Pharyngitis, unspecified etiology  Negative strep test, advise to continue conservative measures  -     POCT rapid strep A  -     Strep A culture, throat        Return if symptoms worsen or fail to improve.      " PRINCIPAL DISCHARGE DIAGNOSIS  Diagnosis: Pancreatitis  Assessment and Plan of Treatment: You came here with abdominal pain. Find out that abdominal pain due to alcohol induced pancreatitis. CT scan showed pancreatitis. now pain improved. Recommend to avoid alcohol consumption. Follow up with GI and PCP in 1 week.      SECONDARY DISCHARGE DIAGNOSES  Diagnosis: Hypertriglyceridemia  Assessment and Plan of Treatment: Your trigluceride/cholesterol was elevated. Continue phenofibrate.    Diagnosis: Transaminitis  Assessment and Plan of Treatment: your liver enzymes were elevated likely due to pancreatitis. Now resolved.    Diagnosis: Acute diarrhea  Assessment and Plan of Treatment: Stool studies were negative for infection. Now resolved

## 2024-09-19 NOTE — DISCHARGE NOTE PROVIDER - HOSPITAL COURSE
HPI:  35 yo M with no reported PMH p/w RLQ pain x Sunday morning. Pain has been constant since onset, was radiating to his back but now localized to his abdomen. Worse with certain movements. Has never had before. Denies nausea, vomiting, fever, chills, chest pain, diarrhea, blood in stool, diarrhea, penile pain/swelling, testicular pain/swelling. No recent travel.  admits to recent alcohol use  (16 Sep 2024 12:10)    Hospital Course:      Important Medication Changes and Reason:    Active or Pending Issues Requiring Follow-up:    Advanced Directives:   [ ] Full code  [ ] DNR  [ ] Hospice    Discharge Diagnoses:         HPI:  33 yo M with no reported PMH p/w RLQ pain x Sunday morning. Pain has been constant since onset, was radiating to his back but now localized to his abdomen. Worse with certain movements. Has never had before. Denies nausea, vomiting, fever, chills, chest pain, diarrhea, blood in stool, diarrhea, penile pain/swelling, testicular pain/swelling. No recent travel.  admits to recent alcohol use  (16 Sep 2024 12:10)    Hospital Course:  Admitted with acute abdominal pain. CT abdomen cW pancreatitis. Treated for acute pancreatitis likely alcohol induced. GI was consulted. S/P IVF and morphine IV. Lab showed  hypertriglyceridemia and leukocytosis. Leukocytosis likely reactive. no signs of infection. Monitored off abx. Started on fenofibrate for triglyceride. C/O diarrhea  GI pcr and C diff was negative. Also liver enzymes were elevated. Now resolved. Leukocytosis now trending down. Abdominal pain resolved, diarrhea resolved. Patient is tolerating diet. Cleared for discharge as per Dr Kuhn, LILO IV morphine , added tylenol prn for pain.      Important Medication Changes and Reason:  Added phenofibrate  Active or Pending Issues Requiring Follow-up:  PCP and GI  Advanced Directives:   [x ] Full code  [ ] DNR  [ ] Hospice    Discharge Diagnoses:  Acute pancreatitis  Leukocytosis  Transaminitis  Hypertrygyceridemia  Diarrhea

## 2024-09-19 NOTE — DISCHARGE NOTE PROVIDER - NSDCMRMEDTOKEN_GEN_ALL_CORE_FT
Vitamin B12 Tablet: 1 tablet orally once a day  Vitamin C Tablet: 1 tablet orally once a day   fenofibrate 48 mg oral tablet: 1 tab(s) orally once a day   fenofibrate 48 mg oral tablet: 1 tab(s) orally once a day  Tylenol 325 mg oral tablet: 2 tab(s) orally 3 times a day as needed for  moderate pain

## 2024-09-19 NOTE — DISCHARGE NOTE PROVIDER - NSDCFUADDAPPT_GEN_ALL_CORE_FT
APPTS ARE READY TO BE MADE: [x ] YES    Best Family or Patient Contact (if needed):    Additional Information about above appointments (if needed):    1:   2:   3:     Other comments or requests:    APPTS ARE READY TO BE MADE: [x ] YES    Best Family or Patient Contact (if needed):    Additional Information about above appointments (if needed):    1:   2:   3:     Other comments or requests:   Patient was outreached on 9/20 9/23 9/24 but did not answer. A voicemail was left for the patient to return our call.

## 2024-09-19 NOTE — PROGRESS NOTE ADULT - ASSESSMENT
pancreatitis  abnormal ct  elevated lfts    suspect etoh induced pancreatitis  low fat diet  TG noted  on fenofibrate now  pain control  montior abdominal exam  etoh cessation  dc planning   d/w patient    I reviewed the overnight course of events on the unit, re-confirming the patient history. I discussed the care with the patient and their family  The plan of care was discussed with the physician assistant and modifications were made to the notation where appropriate.   Differential diagnosis and plan of care discussed with patient after the evaluation  35 minutes spent on total encounter of which more than fifty percent of the encounter was spent counseling and/or coordinating care by the attending physician.  Advanced care planning was discussed with patient and family.  Advanced care planning forms were reviewed and discussed.  Risks, benefits and alternatives of gastroenterologic procedures were discussed in detail and all questions were answered.

## 2024-09-19 NOTE — PROGRESS NOTE ADULT - ASSESSMENT
35 yo male no known pmhx here with acute pancreatitis, likley 2/2 alcohol use    pancreatitis  gi consulted   diet as tolerated   ivf   pain control  trend enzymes  alcohol cessation.     hypertriglyceridemia  FLP noted  fenofibrate ordered  lifestyle modifications    leukocytosis  likely reactive  monitor     diarrhea  check stool pcr and c diff  -- neg       dvt ppx      Advanced care planning was discussed with patient and family.  Advanced care planning forms were reviewed and discussed as appropriate.  Differential diagnosis and plan of care discussed with patient after the evaluation.   Pain assessed and judicious use of narcotics when appropriate was discussed.  Importance of Fall prevention discussed.  Counseling on Smoking and Alcohol cessation was offered when appropriate.  Counseling on Diet, exercise, and medication compliance was done.

## 2024-09-19 NOTE — DISCHARGE NOTE PROVIDER - CARE PROVIDER_API CALL
Kelin Weathers  Internal Medicine  [address] [place] [address]  Phone: [phone]  Fax: [phone]  Follow Up Time:     Nick Brar  Gastroenterology  [address] [place] [address]  Phone: [phone]  Fax: [phone]  Follow Up Time:

## 2024-09-20 ENCOUNTER — TRANSCRIPTION ENCOUNTER (OUTPATIENT)
Age: 34
End: 2024-09-20

## 2024-09-20 VITALS
OXYGEN SATURATION: 97 % | TEMPERATURE: 99 F | HEART RATE: 97 BPM | SYSTOLIC BLOOD PRESSURE: 143 MMHG | RESPIRATION RATE: 18 BRPM | DIASTOLIC BLOOD PRESSURE: 85 MMHG

## 2024-09-20 LAB
ALBUMIN SERPL ELPH-MCNC: 3 G/DL — LOW (ref 3.3–5)
ALP SERPL-CCNC: 79 U/L — SIGNIFICANT CHANGE UP (ref 40–120)
ALT FLD-CCNC: 25 U/L — SIGNIFICANT CHANGE UP (ref 10–45)
ANION GAP SERPL CALC-SCNC: 14 MMOL/L — SIGNIFICANT CHANGE UP (ref 5–17)
AST SERPL-CCNC: 34 U/L — SIGNIFICANT CHANGE UP (ref 10–40)
BILIRUB SERPL-MCNC: 1.1 MG/DL — SIGNIFICANT CHANGE UP (ref 0.2–1.2)
BUN SERPL-MCNC: 7 MG/DL — SIGNIFICANT CHANGE UP (ref 7–23)
CALCIUM SERPL-MCNC: 8.5 MG/DL — SIGNIFICANT CHANGE UP (ref 8.4–10.5)
CHLORIDE SERPL-SCNC: 99 MMOL/L — SIGNIFICANT CHANGE UP (ref 96–108)
CO2 SERPL-SCNC: 20 MMOL/L — LOW (ref 22–31)
CREAT SERPL-MCNC: 0.62 MG/DL — SIGNIFICANT CHANGE UP (ref 0.5–1.3)
EGFR: 129 ML/MIN/1.73M2 — SIGNIFICANT CHANGE UP
GLUCOSE SERPL-MCNC: 78 MG/DL — SIGNIFICANT CHANGE UP (ref 70–99)
HCT VFR BLD CALC: 30.9 % — LOW (ref 39–50)
HGB BLD-MCNC: 10.4 G/DL — LOW (ref 13–17)
MCHC RBC-ENTMCNC: 31.8 PG — SIGNIFICANT CHANGE UP (ref 27–34)
MCHC RBC-ENTMCNC: 33.7 GM/DL — SIGNIFICANT CHANGE UP (ref 32–36)
MCV RBC AUTO: 94.5 FL — SIGNIFICANT CHANGE UP (ref 80–100)
NRBC # BLD: 0 /100 WBCS — SIGNIFICANT CHANGE UP (ref 0–0)
PLATELET # BLD AUTO: 163 K/UL — SIGNIFICANT CHANGE UP (ref 150–400)
POTASSIUM SERPL-MCNC: 3.3 MMOL/L — LOW (ref 3.5–5.3)
POTASSIUM SERPL-SCNC: 3.3 MMOL/L — LOW (ref 3.5–5.3)
PROT SERPL-MCNC: 6.1 G/DL — SIGNIFICANT CHANGE UP (ref 6–8.3)
RBC # BLD: 3.27 M/UL — LOW (ref 4.2–5.8)
RBC # FLD: 12.7 % — SIGNIFICANT CHANGE UP (ref 10.3–14.5)
SODIUM SERPL-SCNC: 133 MMOL/L — LOW (ref 135–145)
WBC # BLD: 14.76 K/UL — HIGH (ref 3.8–10.5)
WBC # FLD AUTO: 14.76 K/UL — HIGH (ref 3.8–10.5)

## 2024-09-20 PROCEDURE — 85025 COMPLETE CBC W/AUTO DIFF WBC: CPT

## 2024-09-20 PROCEDURE — 86900 BLOOD TYPING SEROLOGIC ABO: CPT

## 2024-09-20 PROCEDURE — 87449 NOS EACH ORGANISM AG IA: CPT

## 2024-09-20 PROCEDURE — 0241U: CPT

## 2024-09-20 PROCEDURE — 85730 THROMBOPLASTIN TIME PARTIAL: CPT

## 2024-09-20 PROCEDURE — 96374 THER/PROPH/DIAG INJ IV PUSH: CPT

## 2024-09-20 PROCEDURE — 80048 BASIC METABOLIC PNL TOTAL CA: CPT

## 2024-09-20 PROCEDURE — 83690 ASSAY OF LIPASE: CPT

## 2024-09-20 PROCEDURE — 87324 CLOSTRIDIUM AG IA: CPT

## 2024-09-20 PROCEDURE — 99285 EMERGENCY DEPT VISIT HI MDM: CPT | Mod: 25

## 2024-09-20 PROCEDURE — 87637 SARSCOV2&INF A&B&RSV AMP PRB: CPT

## 2024-09-20 PROCEDURE — 86850 RBC ANTIBODY SCREEN: CPT

## 2024-09-20 PROCEDURE — 86901 BLOOD TYPING SEROLOGIC RH(D): CPT

## 2024-09-20 PROCEDURE — 74177 CT ABD & PELVIS W/CONTRAST: CPT | Mod: MC

## 2024-09-20 PROCEDURE — 85027 COMPLETE CBC AUTOMATED: CPT

## 2024-09-20 PROCEDURE — 85610 PROTHROMBIN TIME: CPT

## 2024-09-20 PROCEDURE — 87507 IADNA-DNA/RNA PROBE TQ 12-25: CPT

## 2024-09-20 PROCEDURE — 80061 LIPID PANEL: CPT

## 2024-09-20 PROCEDURE — 80053 COMPREHEN METABOLIC PANEL: CPT

## 2024-09-20 PROCEDURE — 36415 COLL VENOUS BLD VENIPUNCTURE: CPT

## 2024-09-20 RX ORDER — POTASSIUM CHLORIDE 10 MEQ
40 TABLET, EXT RELEASE, PARTICLES/CRYSTALS ORAL ONCE
Refills: 0 | Status: COMPLETED | OUTPATIENT
Start: 2024-09-20 | End: 2024-09-20

## 2024-09-20 RX ORDER — ACETAMINOPHEN 325 MG/1
2 TABLET ORAL
Qty: 0 | Refills: 0 | DISCHARGE

## 2024-09-20 RX ORDER — FENOFIBRATE 145 MG/1
1 TABLET, FILM COATED ORAL
Qty: 30 | Refills: 0
Start: 2024-09-20 | End: 2024-10-19

## 2024-09-20 RX ADMIN — Medication 2 MILLIGRAM(S): at 05:10

## 2024-09-20 RX ADMIN — Medication 40 MILLIEQUIVALENT(S): at 10:50

## 2024-09-20 RX ADMIN — FENOFIBRATE 48 MILLIGRAM(S): 145 TABLET, FILM COATED ORAL at 11:25

## 2024-09-20 RX ADMIN — SODIUM CHLORIDE 125 MILLILITER(S): 9 INJECTION INTRAMUSCULAR; INTRAVENOUS; SUBCUTANEOUS at 05:10

## 2024-09-20 NOTE — PROGRESS NOTE ADULT - PROVIDER SPECIALTY LIST ADULT
Gastroenterology
Gastroenterology
Internal Medicine
Internal Medicine
Gastroenterology
Internal Medicine
Internal Medicine

## 2024-09-20 NOTE — PROGRESS NOTE ADULT - ASSESSMENT
33 yo male no known pmhx here with acute pancreatitis, likley 2/2 alcohol use    pancreatitis  gi consulted   diet as tolerated   ivf   pain control  trend enzymes  alcohol cessation.     hypertriglyceridemia  FLP noted  fenofibrate ordered  lifestyle modifications    leukocytosis  likely reactive  monitor   improved     diarrhea  check stool pcr and c diff  -- neg   resolved       dvt ppx    dc planning      Advanced care planning was discussed with patient and family.  Advanced care planning forms were reviewed and discussed as appropriate.  Differential diagnosis and plan of care discussed with patient after the evaluation.   Pain assessed and judicious use of narcotics when appropriate was discussed.  Importance of Fall prevention discussed.  Counseling on Smoking and Alcohol cessation was offered when appropriate.  Counseling on Diet, exercise, and medication compliance was done.

## 2024-09-20 NOTE — DISCHARGE NOTE NURSING/CASE MANAGEMENT/SOCIAL WORK - PATIENT PORTAL LINK FT
You can access the FollowMyHealth Patient Portal offered by [facility] by registering at the following website: [email or web address]. By joining [employer]’s FollowMyHealth portal, you will also be able to view your health information using other applications (apps) compatible with our system.

## 2024-09-20 NOTE — PROGRESS NOTE ADULT - SUBJECTIVE AND OBJECTIVE BOX
[facility]      Branden Núñez NP    [address] [place] [address]  [phone]      INTERVAL HPI/OVERNIGHT EVENTS:    patient s/e  pain improved  had diarrhea o/n now resolved    MEDICATIONS  (STANDING):  fenofibrate Tablet 48 milliGRAM(s) Oral daily  influenza   Vaccine 0.5 milliLiter(s) IntraMuscular once  sodium chloride 0.9%. 1000 milliLiter(s) (125 mL/Hr) IV Continuous <Continuous>    MEDICATIONS  (PRN):  morphine  - Injectable 2 milliGRAM(s) IV Push every 6 hours PRN Moderate Pain (4 - 6)      Allergies    No Known Allergies    Intolerances        ROS:   General:  No  fevers, chills, night sweats, fatigue,   Eyes:  Good vision, no reported pain  ENT:  No sore throat, pain, runny nose, dysphagia  CV:  No pain, palpitations, hypo/hypertension  Resp:  No dyspnea, cough, tachypnea, wheezing  GI:  No pain, No nausea, No vomiting, No diarrhea, No constipation, No weight loss, No fever, No pruritis, No rectal bleeding, No tarry stools, No dysphagia,  :  No pain, bleeding, incontinence, nocturia  Muscle:  No pain, weakness  Neuro:  No weakness, tingling, memory problems  Psych:  No fatigue, insomnia, mood problems, depression  Endocrine:  No polyuria, polydipsia, cold/heat intolerance  Heme:  No petechiae, ecchymosis, easy bruisability  Skin:  No rash, tattoos, scars, edema      PHYSICAL EXAM:   Vital Signs:  Vital Signs Last 24 Hrs  T(C): 36.7 (17 Sep 2024 16:16), Max: 36.9 (16 Sep 2024 20:07)  T(F): 98.1 (17 Sep 2024 16:16), Max: 98.5 (17 Sep 2024 00:21)  HR: 123 (17 Sep 2024 16:16) (113 - 124)  BP: 148/98 (17 Sep 2024 16:16) (116/75 - 148/98)  BP(mean): --  RR: 18 (17 Sep 2024 16:16) (18 - 18)  SpO2: 97% (17 Sep 2024 16:16) (96% - 98%)    Parameters below as of 17 Sep 2024 16:16  Patient On (Oxygen Delivery Method): room air      Daily     Daily     GENERAL:  Appears stated age,   HEENT:  NC/AT,    CHEST:  Full & symmetric excursion,   HEART:  Regular rhythm,  ABDOMEN:  Soft, non-tender, non-distended,  EXTEREMITIES:  no cyanosis  SKIN:  No rash  NEURO:  Alert,       LABS:                        12.6   19.58 )-----------( 136      ( 17 Sep 2024 07:03 )             37.4     09-17    134[L]  |  103  |  5[L]  ----------------------------<  92  3.9   |  18[L]  |  0.71    Ca    8.5      17 Sep 2024 07:02    TPro  6.5  /  Alb  3.1[L]  /  TBili  1.3[H]  /  DBili  x   /  AST  59[H]  /  ALT  42  /  AlkPhos  92  09-17    PT/INR - ( 16 Sep 2024 08:51 )   PT: 11.7 sec;   INR: 1.12 ratio         PTT - ( 16 Sep 2024 08:51 )  PTT:35.2 sec  Urinalysis Basic - ( 17 Sep 2024 07:02 )    Color: x / Appearance: x / SG: x / pH: x  Gluc: 92 mg/dL / Ketone: x  / Bili: x / Urobili: x   Blood: x / Protein: x / Nitrite: x   Leuk Esterase: x / RBC: x / WBC x   Sq Epi: x / Non Sq Epi: x / Bacteria: x        RADIOLOGY & ADDITIONAL TESTS:  
ROALNDA BETANCUR  34y Male  MRN:[id number]    Patient is a 34y old  Male who presents with a chief complaint of pancreatitis     HPI:  33 yo M with no reported PMH p/w RLQ pain x Sunday morning. Pain has been constant since onset, was radiating to his back but now localized to his abdomen. Worse with certain movements. Has never had before. Denies nausea, vomiting, fever, chills, chest pain, diarrhea, blood in stool, diarrhea, penile pain/swelling, testicular pain/swelling. No recent travel.  admits to recent alcohol use  (16 Sep 2024 12:10)      Patient seen and evaluated at bedside. No acute events overnight except as noted.    Interval HPI: no acute events o/n     PAST MEDICAL & SURGICAL HISTORY:  No pertinent past medical history      No significant past surgical history          REVIEW OF SYSTEMS:  Constitutional: No fever, chills, fatigue or weight loss.  Skin: No rash.  Eyes: No recent vision problems or eye pain.  ENT: No congestion, ear pain, or sore throat.  Endocrine: No thyroid problems.  Cardiovascular: No chest pain or palpation.  Respiratory: No cough, shortness of breath, congestion, or wheezing.  Gastrointestinal: as per hpi   Genitourinary: No dysuria.  Musculoskeletal: No joint swelling.  Neurologic: No headache.    VITALS:  Vital Signs Last 24 Hrs  T(C): 36.9 (17 Sep 2024 08:18), Max: 36.9 (16 Sep 2024 20:07)  T(F): 98.4 (17 Sep 2024 08:18), Max: 98.5 (17 Sep 2024 00:21)  HR: 124 (17 Sep 2024 08:18) (67 - 124)  BP: 147/94 (17 Sep 2024 08:18) (116/75 - 147/94)  BP(mean): --  RR: 18 (17 Sep 2024 08:18) (18 - 18)  SpO2: 96% (17 Sep 2024 08:18) (96% - 99%)    Parameters below as of 17 Sep 2024 08:18  Patient On (Oxygen Delivery Method): room air      CAPILLARY BLOOD GLUCOSE        I&O's Summary      PHYSICAL EXAM:  GENERAL: NAD, well-developed  HEAD:  Atraumatic, Normocephalic  EYES: EOMI, PERRLA, conjunctiva and sclera clear  NECK: Supple, No JVD  CHEST/LUNG: Clear to auscultation bilaterally; No wheeze  HEART: S1, S2; No murmurs, rubs, or gallops  ABDOMEN: Soft, +tender, Nondistended; Bowel sounds present  EXTREMITIES:  2+ Peripheral Pulses, No clubbing, cyanosis, or edema  PSYCH: Normal affect  NEUROLOGY: AAOX3; non-focal  SKIN: No rashes or lesions    Consultant(s) Notes Reviewed:  [x ] YES  [ ] NO  Care Discussed with Consultants/Other Providers [ x] YES  [ ] NO    MEDS:  MEDICATIONS  (STANDING):  influenza   Vaccine 0.5 milliLiter(s) IntraMuscular once  sodium chloride 0.9%. 1000 milliLiter(s) (125 mL/Hr) IV Continuous <Continuous>    MEDICATIONS  (PRN):  morphine  - Injectable 1 milliGRAM(s) IV Push every 6 hours PRN Severe Pain (7 - 10)  oxyCODONE    IR 5 milliGRAM(s) Oral every 6 hours PRN Moderate Pain (4 - 6)    ALLERGIES:  No Known Allergies      LABS:                        12.6   19.58 )-----------( 136      ( 17 Sep 2024 07:03 )             37.4     09-17    134[L]  |  103  |  5[L]  ----------------------------<  92  3.9   |  18[L]  |  0.71    Ca    8.5      17 Sep 2024 07:02    TPro  6.5  /  Alb  3.1[L]  /  TBili  1.3[H]  /  DBili  x   /  AST  59[H]  /  ALT  42  /  AlkPhos  92  09-17    PT/INR - ( 16 Sep 2024 08:51 )   PT: 11.7 sec;   INR: 1.12 ratio         PTT - ( 16 Sep 2024 08:51 )  PTT:35.2 sec      LIVER FUNCTIONS - ( 17 Sep 2024 07:02 )  Alb: 3.1 g/dL / Pro: 6.5 g/dL / ALK PHOS: 92 U/L / ALT: 42 U/L / AST: 59 U/L / GGT: x           Urinalysis Basic - ( 17 Sep 2024 07:02 )    Color: x / Appearance: x / SG: x / pH: x  Gluc: 92 mg/dL / Ketone: x  / Bili: x / Urobili: x   Blood: x / Protein: x / Nitrite: x   Leuk Esterase: x / RBC: x / WBC x   Sq Epi: x / Non Sq Epi: x / Bacteria: x     < from: CT Abdomen and Pelvis w/ IV Cont (09.16.24 @ 08:32) >  IMPRESSION:  Acute interstitial edematous pancreatitis.    Hepatomegaly and steatosis.    --- End of Report ---      < end of copied text >  
ROLANDA BETANCUR  34y Male  MRN:[id number]    Patient is a 34y old  Male who presents with a chief complaint of pancreatitis     HPI:  35 yo M with no reported PMH p/w RLQ pain x Sunday morning. Pain has been constant since onset, was radiating to his back but now localized to his abdomen. Worse with certain movements. Has never had before. Denies nausea, vomiting, fever, chills, chest pain, diarrhea, blood in stool, diarrhea, penile pain/swelling, testicular pain/swelling. No recent travel.  admits to recent alcohol use  (16 Sep 2024 12:10)      Patient seen and evaluated at bedside. No acute events overnight except as noted.    Interval HPI: c/o diarrhea     PAST MEDICAL & SURGICAL HISTORY:  No pertinent past medical history      No significant past surgical history          REVIEW OF SYSTEMS:  Constitutional: No fever, chills, fatigue or weight loss.  Skin: No rash.  Eyes: No recent vision problems or eye pain.  ENT: No congestion, ear pain, or sore throat.  Endocrine: No thyroid problems.  Cardiovascular: No chest pain or palpation.  Respiratory: No cough, shortness of breath, congestion, or wheezing.  Gastrointestinal: as per hpi   Genitourinary: No dysuria.  Musculoskeletal: No joint swelling.  Neurologic: No headache.    VITALS:   Vital Signs Last 24 Hrs  T(C): 36.7 (18 Sep 2024 08:47), Max: 36.7 (17 Sep 2024 16:16)  T(F): 98.1 (18 Sep 2024 08:47), Max: 98.1 (17 Sep 2024 16:16)  HR: 119 (18 Sep 2024 08:47) (119 - 123)  BP: 143/96 (18 Sep 2024 08:47) (143/96 - 148/98)  BP(mean): --  RR: 18 (18 Sep 2024 08:47) (18 - 18)  SpO2: 98% (18 Sep 2024 08:47) (97% - 98%)    Parameters below as of 18 Sep 2024 08:47  Patient On (Oxygen Delivery Method): room air          PHYSICAL EXAM:  GENERAL: NAD, well-developed  HEAD:  Atraumatic, Normocephalic  EYES: EOMI, PERRLA, conjunctiva and sclera clear  NECK: Supple, No JVD  CHEST/LUNG: Clear to auscultation bilaterally; No wheeze  HEART: S1, S2; No murmurs, rubs, or gallops  ABDOMEN: Soft, +tender, Nondistended; Bowel sounds present  EXTREMITIES:  2+ Peripheral Pulses, No clubbing, cyanosis, or edema  PSYCH: Normal affect  NEUROLOGY: AAOX3; non-focal  SKIN: No rashes or lesions    Consultant(s) Notes Reviewed:  [x ] YES  [ ] NO  Care Discussed with Consultants/Other Providers [ x] YES  [ ] NO    MEDS:   MEDICATIONS  (STANDING):  fenofibrate Tablet 48 milliGRAM(s) Oral daily  influenza   Vaccine 0.5 milliLiter(s) IntraMuscular once  sodium chloride 0.9%. 1000 milliLiter(s) (125 mL/Hr) IV Continuous <Continuous>    MEDICATIONS  (PRN):  morphine  - Injectable 2 milliGRAM(s) IV Push every 6 hours PRN Moderate Pain (4 - 6)        ALLERGIES:  No Known Allergies      LABS:                                    11.6   17.65 )-----------( 135      ( 18 Sep 2024 06:55 )             34.0   09-18    132[L]  |  102  |  6[L]  ----------------------------<  88  3.9   |  18[L]  |  0.68    Ca    8.4      18 Sep 2024 06:58    TPro  6.3  /  Alb  3.0[L]  /  TBili  1.6[H]  /  DBili  x   /  AST  45[H]  /  ALT  35  /  AlkPhos  98  09-18     < from: CT Abdomen and Pelvis w/ IV Cont (09.16.24 @ 08:32) >  IMPRESSION:  Acute interstitial edematous pancreatitis.    Hepatomegaly and steatosis.    --- End of Report ---      < end of copied text >  
ROLANDA BETANCUR  34y Male  MRN:[id number]    Patient is a 34y old  Male who presents with a chief complaint of pancreatitis     HPI:  33 yo M with no reported PMH p/w RLQ pain x Sunday morning. Pain has been constant since onset, was radiating to his back but now localized to his abdomen. Worse with certain movements. Has never had before. Denies nausea, vomiting, fever, chills, chest pain, diarrhea, blood in stool, diarrhea, penile pain/swelling, testicular pain/swelling. No recent travel.  admits to recent alcohol use  (16 Sep 2024 12:10)      Patient seen and evaluated at bedside. No acute events overnight except as noted.    Interval HPI: no acute events o/n     PAST MEDICAL & SURGICAL HISTORY:  No pertinent past medical history      No significant past surgical history          REVIEW OF SYSTEMS:  Constitutional: No fever, chills, fatigue or weight loss.  Skin: No rash.  Eyes: No recent vision problems or eye pain.  ENT: No congestion, ear pain, or sore throat.  Endocrine: No thyroid problems.  Cardiovascular: No chest pain or palpation.  Respiratory: No cough, shortness of breath, congestion, or wheezing.  Gastrointestinal: as per hpi   Genitourinary: No dysuria.  Musculoskeletal: No joint swelling.  Neurologic: No headache.    VITALS:   Vital Signs Last 24 Hrs  T(C): 36.7 (19 Sep 2024 08:17), Max: 36.8 (19 Sep 2024 00:08)  T(F): 98.1 (19 Sep 2024 08:17), Max: 98.2 (19 Sep 2024 00:08)  HR: 109 (19 Sep 2024 08:17) (109 - 113)  BP: 134/96 (19 Sep 2024 08:17) (134/96 - 146/90)  BP(mean): --  RR: 16 (19 Sep 2024 08:17) (16 - 18)  SpO2: 97% (19 Sep 2024 08:17) (94% - 97%)    Parameters below as of 19 Sep 2024 08:17  Patient On (Oxygen Delivery Method): room air          PHYSICAL EXAM:  GENERAL: NAD, well-developed  HEAD:  Atraumatic, Normocephalic  EYES: EOMI, PERRLA, conjunctiva and sclera clear  NECK: Supple, No JVD  CHEST/LUNG: Clear to auscultation bilaterally; No wheeze  HEART: S1, S2; No murmurs, rubs, or gallops  ABDOMEN: Soft, +tender, Nondistended; Bowel sounds present  EXTREMITIES:  2+ Peripheral Pulses, No clubbing, cyanosis, or edema  PSYCH: Normal affect  NEUROLOGY: AAOX3; non-focal  SKIN: No rashes or lesions    Consultant(s) Notes Reviewed:  [x ] YES  [ ] NO  Care Discussed with Consultants/Other Providers [ x] YES  [ ] NO    MEDS:   MEDICATIONS  (STANDING):  fenofibrate Tablet 48 milliGRAM(s) Oral daily  influenza   Vaccine 0.5 milliLiter(s) IntraMuscular once  sodium chloride 0.9%. 1000 milliLiter(s) (125 mL/Hr) IV Continuous <Continuous>    MEDICATIONS  (PRN):  morphine  - Injectable 2 milliGRAM(s) IV Push every 6 hours PRN Moderate Pain (4 - 6)      ALLERGIES:  No Known Allergies      LABS:                                              10.8   17.14 )-----------( 153      ( 19 Sep 2024 07:24 )             32.3   09-19    134[L]  |  100  |  7   ----------------------------<  88  3.2[L]   |  20[L]  |  0.66    Ca    8.5      19 Sep 2024 07:24    TPro  6.3  /  Alb  3.0[L]  /  TBili  1.6[H]  /  DBili  x   /  AST  45[H]  /  ALT  35  /  AlkPhos  98  09-18     < from: CT Abdomen and Pelvis w/ IV Cont (09.16.24 @ 08:32) >  IMPRESSION:  Acute interstitial edematous pancreatitis.    Hepatomegaly and steatosis.    --- End of Report ---      < end of copied text >  
ROLANDA BETANCUR  34y Male  MRN:[id number]    Patient is a 34y old  Male who presents with a chief complaint of pancreatitis     HPI:  35 yo M with no reported PMH p/w RLQ pain x Sunday morning. Pain has been constant since onset, was radiating to his back but now localized to his abdomen. Worse with certain movements. Has never had before. Denies nausea, vomiting, fever, chills, chest pain, diarrhea, blood in stool, diarrhea, penile pain/swelling, testicular pain/swelling. No recent travel.  admits to recent alcohol use  (16 Sep 2024 12:10)      Patient seen and evaluated at bedside. No acute events overnight except as noted.    Interval HPI: no acute events o/n     PAST MEDICAL & SURGICAL HISTORY:  No pertinent past medical history      No significant past surgical history          REVIEW OF SYSTEMS:  Constitutional: No fever, chills, fatigue or weight loss.  Skin: No rash.  Eyes: No recent vision problems or eye pain.  ENT: No congestion, ear pain, or sore throat.  Endocrine: No thyroid problems.  Cardiovascular: No chest pain or palpation.  Respiratory: No cough, shortness of breath, congestion, or wheezing.  Gastrointestinal: as per hpi   Genitourinary: No dysuria.  Musculoskeletal: No joint swelling.  Neurologic: No headache.    VITALS:   Vital Signs Last 24 Hrs  T(C): 37.1 (20 Sep 2024 07:52), Max: 37.1 (20 Sep 2024 07:52)  T(F): 98.7 (20 Sep 2024 07:52), Max: 98.7 (20 Sep 2024 07:52)  HR: 97 (20 Sep 2024 07:52) (97 - 108)  BP: 143/85 (20 Sep 2024 07:52) (143/85 - 147/98)  BP(mean): --  RR: 18 (20 Sep 2024 07:52) (18 - 18)  SpO2: 97% (20 Sep 2024 07:52) (96% - 98%)    Parameters below as of 19 Sep 2024 23:33  Patient On (Oxygen Delivery Method): room air          PHYSICAL EXAM:  GENERAL: NAD, well-developed  HEAD:  Atraumatic, Normocephalic  EYES: EOMI, PERRLA, conjunctiva and sclera clear  NECK: Supple, No JVD  CHEST/LUNG: Clear to auscultation bilaterally; No wheeze  HEART: S1, S2; No murmurs, rubs, or gallops  ABDOMEN: Soft, +tender, Nondistended; Bowel sounds present  EXTREMITIES:  2+ Peripheral Pulses, No clubbing, cyanosis, or edema  PSYCH: Normal affect  NEUROLOGY: AAOX3; non-focal  SKIN: No rashes or lesions    Consultant(s) Notes Reviewed:  [x ] YES  [ ] NO  Care Discussed with Consultants/Other Providers [ x] YES  [ ] NO    MEDS:   MEDICATIONS  (STANDING):  chlorhexidine 2% Cloths 1 Application(s) Topical daily  fenofibrate Tablet 48 milliGRAM(s) Oral daily  influenza   Vaccine 0.5 milliLiter(s) IntraMuscular once  sodium chloride 0.9%. 1000 milliLiter(s) (125 mL/Hr) IV Continuous <Continuous>    MEDICATIONS  (PRN):  morphine  - Injectable 2 milliGRAM(s) IV Push every 6 hours PRN Moderate Pain (4 - 6)      ALLERGIES:  No Known Allergies      LABS:                                                          10.4   14.76 )-----------( 163      ( 20 Sep 2024 07:22 )             30.9   09-20    133[L]  |  99  |  7   ----------------------------<  78  3.3[L]   |  20[L]  |  0.62    Ca    8.5      20 Sep 2024 07:22    TPro  6.1  /  Alb  3.0[L]  /  TBili  1.1  /  DBili  x   /  AST  34  /  ALT  25  /  AlkPhos  79  09-20     < from: CT Abdomen and Pelvis w/ IV Cont (09.16.24 @ 08:32) >  IMPRESSION:  Acute interstitial edematous pancreatitis.    Hepatomegaly and steatosis.    --- End of Report ---      < end of copied text >  
[facility]      Branden Núñez NP    [address] [place] [address]  [phone]      INTERVAL HPI/OVERNIGHT EVENTS:    patient s/e  pain improved  connie diet    MEDICATIONS  (STANDING):  fenofibrate Tablet 48 milliGRAM(s) Oral daily  influenza   Vaccine 0.5 milliLiter(s) IntraMuscular once  sodium chloride 0.9%. 1000 milliLiter(s) (125 mL/Hr) IV Continuous <Continuous>    MEDICATIONS  (PRN):  morphine  - Injectable 2 milliGRAM(s) IV Push every 6 hours PRN Moderate Pain (4 - 6)      Allergies    No Known Allergies    Intolerances        ROS:   General:  No  fevers, chills, night sweats, fatigue,   Eyes:  Good vision, no reported pain  ENT:  No sore throat, pain, runny nose, dysphagia  CV:  No pain, palpitations, hypo/hypertension  Resp:  No dyspnea, cough, tachypnea, wheezing  GI:  No pain, No nausea, No vomiting, No diarrhea, No constipation, No weight loss, No fever, No pruritis, No rectal bleeding, No tarry stools, No dysphagia,  :  No pain, bleeding, incontinence, nocturia  Muscle:  No pain, weakness  Neuro:  No weakness, tingling, memory problems  Psych:  No fatigue, insomnia, mood problems, depression  Endocrine:  No polyuria, polydipsia, cold/heat intolerance  Heme:  No petechiae, ecchymosis, easy bruisability  Skin:  No rash, tattoos, scars, edema      PHYSICAL EXAM:   Vital Signs:  Vital Signs Last 24 Hrs  T(C): 36.7 (17 Sep 2024 16:16), Max: 36.9 (16 Sep 2024 20:07)  T(F): 98.1 (17 Sep 2024 16:16), Max: 98.5 (17 Sep 2024 00:21)  HR: 123 (17 Sep 2024 16:16) (113 - 124)  BP: 148/98 (17 Sep 2024 16:16) (116/75 - 148/98)  BP(mean): --  RR: 18 (17 Sep 2024 16:16) (18 - 18)  SpO2: 97% (17 Sep 2024 16:16) (96% - 98%)    Parameters below as of 17 Sep 2024 16:16  Patient On (Oxygen Delivery Method): room air      Daily     Daily     GENERAL:  Appears stated age,   HEENT:  NC/AT,    CHEST:  Full & symmetric excursion,   HEART:  Regular rhythm,  ABDOMEN:  Soft, non-tender, non-distended,  EXTEREMITIES:  no cyanosis  SKIN:  No rash  NEURO:  Alert,       LABS:                        12.6   19.58 )-----------( 136      ( 17 Sep 2024 07:03 )             37.4     09-17    134[L]  |  103  |  5[L]  ----------------------------<  92  3.9   |  18[L]  |  0.71    Ca    8.5      17 Sep 2024 07:02    TPro  6.5  /  Alb  3.1[L]  /  TBili  1.3[H]  /  DBili  x   /  AST  59[H]  /  ALT  42  /  AlkPhos  92  09-17    PT/INR - ( 16 Sep 2024 08:51 )   PT: 11.7 sec;   INR: 1.12 ratio         PTT - ( 16 Sep 2024 08:51 )  PTT:35.2 sec  Urinalysis Basic - ( 17 Sep 2024 07:02 )    Color: x / Appearance: x / SG: x / pH: x  Gluc: 92 mg/dL / Ketone: x  / Bili: x / Urobili: x   Blood: x / Protein: x / Nitrite: x   Leuk Esterase: x / RBC: x / WBC x   Sq Epi: x / Non Sq Epi: x / Bacteria: x        RADIOLOGY & ADDITIONAL TESTS:  
[facility]      Branden Núñez NP    [address] [place] [address]  [phone]      INTERVAL HPI/OVERNIGHT EVENTS:    patient s/e  still with pain but improved     MEDICATIONS  (STANDING):  fenofibrate Tablet 48 milliGRAM(s) Oral daily  influenza   Vaccine 0.5 milliLiter(s) IntraMuscular once  sodium chloride 0.9%. 1000 milliLiter(s) (125 mL/Hr) IV Continuous <Continuous>    MEDICATIONS  (PRN):  morphine  - Injectable 2 milliGRAM(s) IV Push every 6 hours PRN Moderate Pain (4 - 6)      Allergies    No Known Allergies    Intolerances        ROS:   General:  No  fevers, chills, night sweats, fatigue,   Eyes:  Good vision, no reported pain  ENT:  No sore throat, pain, runny nose, dysphagia  CV:  No pain, palpitations, hypo/hypertension  Resp:  No dyspnea, cough, tachypnea, wheezing  GI:  No pain, No nausea, No vomiting, No diarrhea, No constipation, No weight loss, No fever, No pruritis, No rectal bleeding, No tarry stools, No dysphagia,  :  No pain, bleeding, incontinence, nocturia  Muscle:  No pain, weakness  Neuro:  No weakness, tingling, memory problems  Psych:  No fatigue, insomnia, mood problems, depression  Endocrine:  No polyuria, polydipsia, cold/heat intolerance  Heme:  No petechiae, ecchymosis, easy bruisability  Skin:  No rash, tattoos, scars, edema      PHYSICAL EXAM:   Vital Signs:  Vital Signs Last 24 Hrs  T(C): 36.7 (17 Sep 2024 16:16), Max: 36.9 (16 Sep 2024 20:07)  T(F): 98.1 (17 Sep 2024 16:16), Max: 98.5 (17 Sep 2024 00:21)  HR: 123 (17 Sep 2024 16:16) (113 - 124)  BP: 148/98 (17 Sep 2024 16:16) (116/75 - 148/98)  BP(mean): --  RR: 18 (17 Sep 2024 16:16) (18 - 18)  SpO2: 97% (17 Sep 2024 16:16) (96% - 98%)    Parameters below as of 17 Sep 2024 16:16  Patient On (Oxygen Delivery Method): room air      Daily     Daily     GENERAL:  Appears stated age,   HEENT:  NC/AT,    CHEST:  Full & symmetric excursion,   HEART:  Regular rhythm,  ABDOMEN:  Soft, non-tender, non-distended,  EXTEREMITIES:  no cyanosis  SKIN:  No rash  NEURO:  Alert,       LABS:                        12.6   19.58 )-----------( 136      ( 17 Sep 2024 07:03 )             37.4     09-17    134[L]  |  103  |  5[L]  ----------------------------<  92  3.9   |  18[L]  |  0.71    Ca    8.5      17 Sep 2024 07:02    TPro  6.5  /  Alb  3.1[L]  /  TBili  1.3[H]  /  DBili  x   /  AST  59[H]  /  ALT  42  /  AlkPhos  92  09-17    PT/INR - ( 16 Sep 2024 08:51 )   PT: 11.7 sec;   INR: 1.12 ratio         PTT - ( 16 Sep 2024 08:51 )  PTT:35.2 sec  Urinalysis Basic - ( 17 Sep 2024 07:02 )    Color: x / Appearance: x / SG: x / pH: x  Gluc: 92 mg/dL / Ketone: x  / Bili: x / Urobili: x   Blood: x / Protein: x / Nitrite: x   Leuk Esterase: x / RBC: x / WBC x   Sq Epi: x / Non Sq Epi: x / Bacteria: x        RADIOLOGY & ADDITIONAL TESTS:

## 2024-09-21 ENCOUNTER — TRANSCRIPTION ENCOUNTER (OUTPATIENT)
Age: 34
End: 2024-09-21

## 2024-09-23 ENCOUNTER — TRANSCRIPTION ENCOUNTER (OUTPATIENT)
Age: 34
End: 2024-09-23

## 2024-10-01 ENCOUNTER — TRANSCRIPTION ENCOUNTER (OUTPATIENT)
Age: 34
End: 2024-10-01

## 2024-10-08 ENCOUNTER — TRANSCRIPTION ENCOUNTER (OUTPATIENT)
Age: 34
End: 2024-10-08

## 2024-10-08 PROBLEM — Z00.00 ENCOUNTER FOR PREVENTIVE HEALTH EXAMINATION: Status: ACTIVE | Noted: 2024-10-08

## 2025-06-12 NOTE — PATIENT PROFILE ADULT - [id number]
Spoke to pt and scheduled an appt on 6/24, pt verbalized understanding----- Message from Med Assistant Desai sent at 6/12/2025 11:50 AM CDT -----    ----- Message -----  From: Souleymane Deutsch MD  Sent: 6/11/2025   5:55 PM CDT  To: Kelle Gilmoer PA-C; [facility]    Magdy Nina,Thanks for reaching out. I would like to examine her hip now, while on Abx, so I have a baseline in case things worsen after stopping the Abx. Do you mind keeping her on the oral Abx for another week or two until I get her into clinic?Team - Please schedule Ms. Coronado to see me at her earliest availability. You can overbook.Thanks!-Souleymane  ----- Message -----  From: Kelle Gilmore PA-C  Sent: 6/11/2025  12:42 PM CDT  To: Souleymane Deutsch MD    Hi Dr. Deutsch,    I spoke with Mrs. Coronado. She has completed six weeks of IV antibiotics followed by three months of oral antibiotics for her PJI.  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 After discussing with multiple ID staff providers, we decided this is likely a contaminant and not to treat given her clinical improvement without treatment and treatment would be associated with many toxicities. We plan to stop antibiotics and monitor off antibiotics for now. Just wanted to make sure you are ok with this. If she clinically declines, maybe we can discuss doing an US guided aspiration to see if the infection is cleared.  What are your thoughts?      Kelle   no